# Patient Record
Sex: MALE | Race: BLACK OR AFRICAN AMERICAN | NOT HISPANIC OR LATINO | Employment: FULL TIME | URBAN - METROPOLITAN AREA
[De-identification: names, ages, dates, MRNs, and addresses within clinical notes are randomized per-mention and may not be internally consistent; named-entity substitution may affect disease eponyms.]

---
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
## 2020-01-19 ENCOUNTER — HOSPITAL ENCOUNTER (EMERGENCY)
Facility: CLINIC | Age: 20
Discharge: HOME | End: 2020-01-19
Attending: EMERGENCY MEDICINE | Admitting: EMERGENCY MEDICINE
Payer: COMMERCIAL

## 2020-01-19 VITALS
RESPIRATION RATE: 18 BRPM | SYSTOLIC BLOOD PRESSURE: 115 MMHG | HEART RATE: 78 BPM | HEIGHT: 74 IN | DIASTOLIC BLOOD PRESSURE: 77 MMHG | OXYGEN SATURATION: 99 % | BODY MASS INDEX: 26.31 KG/M2 | TEMPERATURE: 98.2 F | WEIGHT: 205 LBS
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
## 2020-01-19 PROCEDURE — 99283 EMERGENCY DEPT VISIT LOW MDM: CPT

## 2020-01-19 ASSESSMENT — ENCOUNTER SYMPTOMS: WOUND: 1

## 2020-01-19 ASSESSMENT — MIFFLIN-ST. JEOR: SCORE: 2009.62

## 2020-01-19 NOTE — ED AVS SNAPSHOT
Emergency Department  [address] [place] [address]  Phone:  [phone]  Fax:  [phone]                                    Maurilio Mattson    MRN: [id number]    Department:   Emergency Department   Date of Visit:  1/19/2020           After Visit Summary Signature Page    I have received my discharge instructions, and my questions have been answered. I have discussed any challenges I see with this plan with the nurse or doctor.    ..........................................................................................................................................  Patient/Patient Representative Signature      ..........................................................................................................................................  Patient Representative Print Name and Relationship to Patient    ..................................................               ................................................  Date                                   Time    ..........................................................................................................................................  Reviewed by Signature/Title    ...................................................              ..............................................  Date                                               Time          22EPIC Rev 08/18
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
## 2020-01-20 NOTE — ED NOTES
RN at bedside to speak to patient. Updated on plan of care. Supervisor from [facility] on the phone with MD. MD to update patient and family as soon as possible.
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
## 2020-03-28 ENCOUNTER — HOSPITAL ENCOUNTER (EMERGENCY)
Facility: CLINIC | Age: 20
Discharge: HOME | End: 2020-03-28
Attending: EMERGENCY MEDICINE | Admitting: EMERGENCY MEDICINE
Payer: COMMERCIAL

## 2020-03-28 ENCOUNTER — APPOINTMENT (OUTPATIENT)
Dept: CT IMAGING | Facility: CLINIC | Age: 20
End: 2020-03-28
Attending: EMERGENCY MEDICINE
Payer: COMMERCIAL

## 2020-03-28 VITALS — DIASTOLIC BLOOD PRESSURE: 67 MMHG | OXYGEN SATURATION: 99 % | SYSTOLIC BLOOD PRESSURE: 117 MMHG

## 2020-03-28 DIAGNOSIS — S05.02XA ABRASION OF LEFT CORNEA, INITIAL ENCOUNTER: ICD-10-CM

## 2020-03-28 DIAGNOSIS — S00.12XA PERIORBITAL ECCHYMOSIS OF LEFT EYE, INITIAL ENCOUNTER: ICD-10-CM

## 2020-03-28 LAB
AMPHETAMINES UR QL SCN: NEGATIVE
BARBITURATES UR QL: NEGATIVE
BENZODIAZ UR QL: NEGATIVE
CANNABINOIDS UR QL SCN: POSITIVE
COCAINE UR QL: NEGATIVE
OPIATES UR QL SCN: NEGATIVE
PCP UR QL SCN: NEGATIVE

## 2020-03-28 PROCEDURE — 70486 CT MAXILLOFACIAL W/O DYE: CPT

## 2020-03-28 PROCEDURE — 25000125 ZZHC RX 250: Performed by: EMERGENCY MEDICINE

## 2020-03-28 PROCEDURE — 99285 EMERGENCY DEPT VISIT HI MDM: CPT | Mod: 25

## 2020-03-28 PROCEDURE — 70450 CT HEAD/BRAIN W/O DYE: CPT

## 2020-03-28 PROCEDURE — 80307 DRUG TEST PRSMV CHEM ANLYZR: CPT | Performed by: EMERGENCY MEDICINE

## 2020-03-28 RX ORDER — ERYTHROMYCIN 5 MG/G
0.5 OINTMENT OPHTHALMIC 4 TIMES DAILY
Qty: 1 TUBE | Refills: 0 | Status: SHIPPED | OUTPATIENT
Start: 2020-03-28 | End: 2020-04-04

## 2020-03-28 RX ORDER — PROPARACAINE HYDROCHLORIDE 5 MG/ML
2 SOLUTION/ DROPS OPHTHALMIC ONCE
Status: COMPLETED | OUTPATIENT
Start: 2020-03-28 | End: 2020-03-28

## 2020-03-28 RX ADMIN — PROPARACAINE HYDROCHLORIDE 2 DROP: 5 SOLUTION/ DROPS OPHTHALMIC at 05:26

## 2020-03-28 RX ADMIN — FLUORESCEIN SODIUM 1 STRIP: 1 STRIP OPHTHALMIC at 05:26

## 2020-03-28 ASSESSMENT — ENCOUNTER SYMPTOMS
LIGHT-HEADEDNESS: 1
WOUND: 1
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
## 2020-03-28 NOTE — ED AVS SNAPSHOT
Emergency Department  [address] [place] [address]  Phone:  [phone]  Fax:  [phone]                                    Maurilio Mattson    MRN: [id number]    Department:   Emergency Department   Date of Visit:  3/28/2020           After Visit Summary Signature Page    I have received my discharge instructions, and my questions have been answered. I have discussed any challenges I see with this plan with the nurse or doctor.    ..........................................................................................................................................  Patient/Patient Representative Signature      ..........................................................................................................................................  Patient Representative Print Name and Relationship to Patient    ..................................................               ................................................  Date                                   Time    ..........................................................................................................................................  Reviewed by Signature/Title    ...................................................              ..............................................  Date                                               Time          22EPIC Rev 08/18

## 2020-03-28 NOTE — ED TRIAGE NOTES
"Patient presented to triage desk multiple times.  With a few male friends, his Mom.  Patient yelling, he was yelling at security guards initially.  Then when writer asked him to stop yelling.  He aggressively approached the triage desk, came over the glass surround, yelling.  \"Hey, I don't like you all.  I don't like how you all are talking to me!  And you know what!  I have that Coronavirus too!  Yeah!  I am I going to cough that shit all over you!\"  He proceeded to pull his mask down & cough & spit at the staff over the glass surrounding the triage desk.     Patient was beligerent.  Unable to get under control.  Asked patient to sit outside with his Mother & friends.  Will be willing to see him when he is able to give us his name & date of birth.  Everyone in this group is yelling at someone & currently no one is able to give a name or .  They did step out briefly.  Came back into the ED triage area.  Yelling ensued again & escalated.  Patient crying & beligerent.  Does have a left swollen eye.  Yelling again; \"I am going to kill all of you!\"  Patient continually making threats to security & ED staff.  Due to unable to get this entire group under control or any members to assist with patient [place] was called for assistance.    "

## 2020-04-16 ENCOUNTER — HOSPITAL ENCOUNTER (EMERGENCY)
Facility: CLINIC | Age: 20
Discharge: HOME | End: 2020-04-16
Attending: EMERGENCY MEDICINE | Admitting: EMERGENCY MEDICINE
Payer: COMMERCIAL

## 2020-04-16 VITALS — DIASTOLIC BLOOD PRESSURE: 79 MMHG | HEART RATE: 116 BPM | OXYGEN SATURATION: 93 % | SYSTOLIC BLOOD PRESSURE: 96 MMHG

## 2020-04-16 DIAGNOSIS — R45.851 SUICIDAL IDEATION: ICD-10-CM
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
## 2020-04-16 DIAGNOSIS — F10.229 ACUTE ALCOHOLIC INTOXICATION IN ALCOHOLISM WITH COMPLICATION (H): ICD-10-CM

## 2020-04-16 LAB
ALBUMIN SERPL-MCNC: 3.8 G/DL (ref 3.4–5)
ALP SERPL-CCNC: 97 U/L (ref 40–150)
ALT SERPL W P-5'-P-CCNC: 22 U/L (ref 0–70)
AMPHETAMINES UR QL SCN: NEGATIVE
ANION GAP SERPL CALCULATED.3IONS-SCNC: 11 MMOL/L (ref 3–14)
AST SERPL W P-5'-P-CCNC: 21 U/L (ref 0–45)
BARBITURATES UR QL: NEGATIVE
BASOPHILS # BLD AUTO: 0 10E9/L (ref 0–0.2)
BASOPHILS NFR BLD AUTO: 0.4 %
BENZODIAZ UR QL: POSITIVE
BILIRUB SERPL-MCNC: 0.3 MG/DL (ref 0.2–1.3)
BUN SERPL-MCNC: 14 MG/DL (ref 7–30)
CALCIUM SERPL-MCNC: 8.7 MG/DL (ref 8.5–10.1)
CANNABINOIDS UR QL SCN: POSITIVE
CHLORIDE SERPL-SCNC: 109 MMOL/L (ref 94–109)
CO2 SERPL-SCNC: 23 MMOL/L (ref 20–32)
COCAINE UR QL: NEGATIVE
CREAT SERPL-MCNC: 0.95 MG/DL (ref 0.66–1.25)
DIFFERENTIAL METHOD BLD: ABNORMAL
EOSINOPHIL # BLD AUTO: 0 10E9/L (ref 0–0.7)
EOSINOPHIL NFR BLD AUTO: 0 %
ERYTHROCYTE [DISTWIDTH] IN BLOOD BY AUTOMATED COUNT: 12.4 % (ref 10–15)
ETHANOL SERPL-MCNC: 0.19 G/DL
GFR SERPL CREATININE-BSD FRML MDRD: >90 ML/MIN/{1.73_M2}
GLUCOSE SERPL-MCNC: 94 MG/DL (ref 70–99)
HCT VFR BLD AUTO: 43.4 % (ref 40–53)
HGB BLD-MCNC: 15 G/DL (ref 13.3–17.7)
IMM GRANULOCYTES # BLD: 0 10E9/L (ref 0–0.4)
IMM GRANULOCYTES NFR BLD: 0.4 %
LYMPHOCYTES # BLD AUTO: 1.7 10E9/L (ref 0.8–5.3)
LYMPHOCYTES NFR BLD AUTO: 61.5 %
MCH RBC QN AUTO: 28.9 PG (ref 26.5–33)
MCHC RBC AUTO-ENTMCNC: 34.6 G/DL (ref 31.5–36.5)
MCV RBC AUTO: 84 FL (ref 78–100)
MONOCYTES # BLD AUTO: 0 10E9/L (ref 0–1.3)
MONOCYTES NFR BLD AUTO: 0 %
NEUTROPHILS # BLD AUTO: 1.1 10E9/L (ref 1.6–8.3)
NEUTROPHILS NFR BLD AUTO: 37.7 %
NRBC # BLD AUTO: 0 10*3/UL
NRBC BLD AUTO-RTO: 0 /100
OPIATES UR QL SCN: NEGATIVE
PCP UR QL SCN: NEGATIVE
PLATELET # BLD AUTO: 203 10E9/L (ref 150–450)
POTASSIUM SERPL-SCNC: 3.1 MMOL/L (ref 3.4–5.3)
PROT SERPL-MCNC: 7.1 G/DL (ref 6.8–8.8)
RBC # BLD AUTO: 5.19 10E12/L (ref 4.4–5.9)
SODIUM SERPL-SCNC: 143 MMOL/L (ref 133–144)
WBC # BLD AUTO: 2.8 10E9/L (ref 4–11)

## 2020-04-16 PROCEDURE — 99285 EMERGENCY DEPT VISIT HI MDM: CPT | Mod: 25

## 2020-04-16 PROCEDURE — 80307 DRUG TEST PRSMV CHEM ANLYZR: CPT | Performed by: EMERGENCY MEDICINE

## 2020-04-16 PROCEDURE — 25000132 ZZH RX MED GY IP 250 OP 250 PS 637: Performed by: EMERGENCY MEDICINE

## 2020-04-16 PROCEDURE — 85025 COMPLETE CBC W/AUTO DIFF WBC: CPT | Performed by: EMERGENCY MEDICINE

## 2020-04-16 PROCEDURE — 90791 PSYCH DIAGNOSTIC EVALUATION: CPT

## 2020-04-16 PROCEDURE — 80320 DRUG SCREEN QUANTALCOHOLS: CPT | Performed by: EMERGENCY MEDICINE

## 2020-04-16 PROCEDURE — 80053 COMPREHEN METABOLIC PANEL: CPT | Performed by: EMERGENCY MEDICINE

## 2020-04-16 RX ORDER — HYDROXYZINE HYDROCHLORIDE 25 MG/1
25-50 TABLET, FILM COATED ORAL EVERY 8 HOURS PRN
Status: DISCONTINUED | OUTPATIENT
Start: 2020-04-16 | End: 2020-04-16 | Stop reason: HOSPADM

## 2020-04-16 RX ORDER — ESCITALOPRAM OXALATE 10 MG/1
10 TABLET ORAL ONCE
Status: COMPLETED | OUTPATIENT
Start: 2020-04-16 | End: 2020-04-16

## 2020-04-16 RX ADMIN — HYDROXYZINE HYDROCHLORIDE 25 MG: 25 TABLET, FILM COATED ORAL at 07:45

## 2020-04-16 RX ADMIN — ESCITALOPRAM OXALATE 10 MG: 10 TABLET ORAL at 07:45

## 2020-04-16 NOTE — ED PROVIDER NOTES
"  History     Chief Complaint:  Suicidal and Alcohol Intoxication      HPI (limited due to patient sedation on arrival to ED)  Maurilio Mattson Jr. is a 19 year old male with a history of substance abuse and ADHD who presents via EMS with alcohol intoxication and suicidal thoughts.  Per EMS report police were called to the scene where patient was witnessed holding a knife to his neck, threatening to kill himself in front of his family and friends after having a break-up with his girlfriend earlier this evening.  Patient apparently had been drinking earlier this evening as well.  Patient had told officers that he wanted to stab himself in the neck and he stated \"I am suicidal.\"  Patient was quite aggressive with police and did require IV sedation medication including 2 mg of Versed and 5 mg of Haldol prehospital.  Patient is sedated upon arrival and not restrained.  Unfortunately patient is unable to contribute to history at this time due to significant sedation medication and intoxication.    Allergies:  No Known Drug Allergies     Medications:    Adderall  Focalin  Lexapro  Atarax  Melatonin   Albuterol     Past Medical History:    ADHD    Past Surgical History:    Hernia repair    Family History:    Family history reviewed. No pertinent family history.     Social History:  Smoking Status: Never Smoker  Alcohol Use: Negative   Drug Use: Negative  PCP: Jojo Tillman   Marital Status:  Single      Review of Systems   Unable to perform ROS: Mental status change     Physical Exam   No data found.       Physical Exam  General: Sedated, appears well-developed and well-nourished.    In no distress  HEENT:  Head:  Atraumatic  Ears:  External ears are normal  Mouth/Throat:  Oropharynx is without erythema or exudate and mucous membranes are moist.   Eyes:   Conjunctivae normal and EOM are normal. No scleral icterus.  CV:  Normal rate, regular rhythm, normal heart sounds and radial pulses are 2+ and symmetric.  No " murmur.  Resp:  Breath sounds are clear bilaterally    Non-labored, no retractions or accessory muscle use  GI:  Abdomen is soft, no distension, no tenderness.   MS:  Normal range of motion. No edema.    Back atraumatic.  Skin:  Warm and dry.  No rash or lesions noted.  Neuro: Sedated.  Moving all extremities.    Psych:  Unable to assess at this time due to intoxication and sedation medications.  Reported suicidal statements and actions at home earlier this evening.      Emergency Department Course     Laboratory:  Laboratory findings were communicated with the patient who voiced understanding of the findings.    Drug abuse screen 77 urine: pending     CBC: WBC 2.8 (L), HGB 15.0,   CMP: potassium 3.1 (L) o/w WNL (Creatinine 0.95)  Alcohol ethyl: 0.19 (H)       Emergency Department Course:    0325 Nursing notes and vitals reviewed. I performed an exam of the patient as documented above.     0339 IV was inserted and blood was drawn for laboratory testing, results above.      Patient is signed out to my associate Dr. Crowell.    Impression & Plan      Medical Decision Making:  Maurilio Mattson  is a 19 year old male with past medical history of ADHD and substance abuse who presents with intoxication, agitation, and suicidality.  Patient unfortunately is sedated on arrival and unable to contribute to exam or history taking.  There were concerning actions reported at home with patient threatening to stab himself in the neck.  Patient apparently was also holding a knife at the scene.  These gestures are highly concerning for suicidal ideation and I do feel the patient warrants mental health evaluation upon sobering.  Patient continues to sober appropriately while under my care in the emergency department.  His blood alcohol concentration is 0.19.  Patient care was signed out to my oncoming partner  awaiting clinical sobriety and mental health evaluation.    Diagnosis:    ICD-10-CM    1. Acute  alcoholic intoxication in alcoholism with complication (H)  F10.229 CBC with platelets differential     Comprehensive metabolic panel     Alcohol ethyl   2. Suicidal ideation  R45.851    3. Suicide attempt by inadequate means, initial encounter (H)  X83.8XXA      Disposition:   Patient is signed out to my associate Dr. Crowell.    Scribe Disclosure:  I, Mich Carranza, am serving as a scribe at 3:27 AM on 4/16/2020 to document services personally performed by Alejandro Velazquez MD based on my observations and the provider's statements to me.       EMERGENCY DEPARTMENT       Alejandro Velazquez MD  04/16/20 0569

## 2020-04-16 NOTE — ED NOTES
"Received report and went in and introduced self to patient. Patient tearful on and off stating \"I just want to go home, im going crazier being in here\". \"Im not suicidal anymore and I know my mom and girlfriend are at my moms right now and they want me to come home\". I explained to patient that he has to DEC first and then we will have a plan together. Patient keeps saying \"come on bro, I need to get out of here, my heart is racing and im freaking out\". I told patient I will bring him his morning meds and anxiety meds.   "

## 2020-04-16 NOTE — ED TRIAGE NOTES
"Patient arrived via EMS, police were called when the patient was witnessed holding a knife to his neck, threatening to kill himself, in front of family and friends after breaking up with his girlfriend earlier tonight. Patient told officers that he wanted stab himself in the neck and stated \"I am suicidal\", he was aggressive with police and EMS on arrival but he has been cooperative with staff for the most part. Patient is intoxicated.  "

## 2020-04-16 NOTE — ED NOTES
Bed: BH3  Expected date:   Expected time:   Means of arrival:   Comments:  449 20m psych eval eta 1 min

## 2020-04-16 NOTE — ED AVS SNAPSHOT
Emergency Department  [address] [place] [id number]  Phone:  [phone]  Fax:  [phone]                                    Maurilio Mattson    MRN: [id number]    Department:   Emergency Department   Date of Visit:  4/16/2020           After Visit Summary Signature Page    I have received my discharge instructions, and my questions have been answered. I have discussed any challenges I see with this plan with the nurse or doctor.    ..........................................................................................................................................  Patient/Patient Representative Signature      ..........................................................................................................................................  Patient Representative Print Name and Relationship to Patient    ..................................................               ................................................  Date                                   Time    ..........................................................................................................................................  Reviewed by Signature/Title    ...................................................              ..............................................  Date                                               Time          22EPIC Rev 08/18

## 2020-04-16 NOTE — ED PROVIDER NOTES
Patient has sobered up appropriately and has now been seen by Neisha, our DEC .  She also spoke with the patient's mother.  The patient currently is not suicidal and indicates that he was doing this just because he was drunk and upset from the break-up with his girlfriend.  He therefore is deemed safe for discharge.  Neisha is also setting up outpatient therapy for the patient, and I also recommended he follow-up with his physician as well.  He should return with any concerns or worsening symptoms.  The patient's mother is going to come pick him up.     Kunal Crowell MD  04/16/20 4055

## 2020-08-13 ENCOUNTER — HOSPITAL PATHOLOGY (OUTPATIENT)
Dept: OTHER | Facility: CLINIC | Age: 20
End: 2020-08-13

## 2020-08-17 LAB — COPATH REPORT: NORMAL

## 2020-08-26 ENCOUNTER — HOSPITAL ENCOUNTER (EMERGENCY)
Facility: CLINIC | Age: 20
Discharge: HOME | End: 2020-08-26
Attending: EMERGENCY MEDICINE | Admitting: EMERGENCY MEDICINE
Payer: COMMERCIAL

## 2020-08-26 VITALS
DIASTOLIC BLOOD PRESSURE: 85 MMHG | RESPIRATION RATE: 18 BRPM | TEMPERATURE: 97.4 F | HEART RATE: 60 BPM | OXYGEN SATURATION: 99 % | SYSTOLIC BLOOD PRESSURE: 135 MMHG

## 2020-08-26 DIAGNOSIS — F41.9 ANXIETY: ICD-10-CM

## 2020-08-26 PROCEDURE — 99283 EMERGENCY DEPT VISIT LOW MDM: CPT

## 2020-08-26 RX ORDER — LORAZEPAM 1 MG/1
1 TABLET ORAL ONCE
Status: DISCONTINUED | OUTPATIENT
Start: 2020-08-26 | End: 2020-08-26 | Stop reason: HOSPADM

## 2020-08-26 ASSESSMENT — ENCOUNTER SYMPTOMS: HALLUCINATIONS: 0

## 2020-08-26 NOTE — ED TRIAGE NOTES
Patient currently under self quarantine since last Wednesday secondary to positive covid exposure.  Patient reports anxiety and needing medication.  Patient denies SI.  Patient arrives voluntarily.

## 2020-08-26 NOTE — ED PROVIDER NOTES
History     Chief Complaint:  Panic Attack      HPI   Maurilio Mattson Jr. is a 20 year old male current marijuana user who presents with panic attack. The patient comes in stating the his girlfriend is going to be a freshman in [place] and wants to take a break and he hates his life. This is the same girlfriend who broke up with him on 04/16/2020 and the patient was subsequently seen in the emergency department for suicidal ideations. He reports that he doesn't get along with brother and sister or half the time with his mother. He is feeling depressed and does not like himself. He is not on any medications. He smokes mariajuana daily and occasionally drinks alcohol. No auditory or visual hallucinations. He has seen a psychiatrist in the past, but nothing active now. Notes that hydroxyzine did not help.     Allergies:  No known drug allergies    Medications:    The patient is not currently taking any prescribed medications.    Past Medical History:    ADHD  Suicidal ideations     Past Surgical History:    Hernia repair    Family History:    History reviewed. No pertinent family history.     Social History:  Smoking status: no  Alcohol use: Yes  Drug use: Yes - marijuana   The patient presents to the emergency department by personal vehicle     PCP: Jojo Tillman  Marital Status:  Single [1]    Review of Systems   Psychiatric/Behavioral: Negative for hallucinations.        Depressed   All other systems reviewed and are negative.      Physical Exam     Patient Vitals for the past 24 hrs:   BP Temp Temp src Pulse Resp SpO2   08/26/20 0819 135/85 97.4  F (36.3  C) Oral 60 18 99 %       Physical Exam  GENERAL: well developed, pleasant  HEAD: atraumatic  EYES: pupils reactive, extraocular muscles intact, conjunctivae normal  ENT:  mucus membranes moist  NECK:  trachea midline, normal range of motion  RESPIRATORY: no tachypnea, breath sounds clear to auscultation   CVS: normal S1/S2, no murmurs, intact distal  pulses  ABDOMEN: soft, nontender, nondistention  MUSCULOSKELETAL: no deformities  SKIN: warm and dry, no acute rashes or ulceration  NEURO: GCS 15, cranial nerves intact, alert and oriented x3  PSYCH: Good eye contact starts to ramp up slightly when talking about his girlfriend but is able to be redirected not responding to external stimuli, not suicidal    Emergency Department Course   Laboratory:  Laboratory findings were communicated with the patient who voiced understanding of the findings.    Drug abuse screen 77 urine ([place], , ): did not complete before patient eloped     Emergency Department Course:  Past medical records, nursing notes, and vitals reviewed.  0820: I performed an exam of the patient and obtained history, as documented above.     0834: I spoke with the DEC      0837: I rechecked the patient     0853: patient eloped   Impression & Plan   Medical Decision Making:    Patient presents with anxiety and depression with recent break-up with his girlfriend.  Patient is not suicidal.  He is not currently taking any medications.  Did walk back to ask DEC to talk with him.  I was then informed that the patient was leaving.  I went back and the patient had already left.  He noted to the nursing team that he wanted to talk to the doctor and not be asked a bunch more questions.  Patient is not suicidal or psychotic he does not need to be tracked down.  Patient eloped before the encounter could be finished.    Diagnosis:    ICD-10-CM    1. Anxiety  F41.9        Disposition:  Patient eloped     [facility]  8/26/2020    EMERGENCY DEPARTMENT  Scribe Disclosure:  I, [facility], am serving as a scribe at 8:20 AM on 8/26/2020 to document services personally performed by Corey Payton MD based on my observations and the provider's statements to me.        Corey Payton MD  08/26/20 8051

## 2022-07-27 ENCOUNTER — HOSPITAL ENCOUNTER (EMERGENCY)
Facility: CLINIC | Age: 22
Discharge: HOME | End: 2022-07-28
Attending: EMERGENCY MEDICINE | Admitting: EMERGENCY MEDICINE
Payer: COMMERCIAL

## 2022-07-27 VITALS
WEIGHT: 182 LBS | HEART RATE: 60 BPM | OXYGEN SATURATION: 98 % | DIASTOLIC BLOOD PRESSURE: 76 MMHG | HEIGHT: 74 IN | BODY MASS INDEX: 23.36 KG/M2 | TEMPERATURE: 97.8 F | RESPIRATION RATE: 21 BRPM | SYSTOLIC BLOOD PRESSURE: 128 MMHG

## 2022-07-27 DIAGNOSIS — M62.82 NON-TRAUMATIC RHABDOMYOLYSIS: ICD-10-CM

## 2022-07-27 LAB
ALBUMIN SERPL-MCNC: 4 G/DL (ref 3.4–5)
ALP SERPL-CCNC: 119 U/L (ref 40–150)
ALT SERPL W P-5'-P-CCNC: 76 U/L (ref 0–70)
ANION GAP SERPL CALCULATED.3IONS-SCNC: 6 MMOL/L (ref 3–14)
AST SERPL W P-5'-P-CCNC: 68 U/L (ref 0–45)
BASOPHILS # BLD AUTO: 0 10E3/UL (ref 0–0.2)
BASOPHILS NFR BLD AUTO: 0 %
BILIRUB SERPL-MCNC: 0.6 MG/DL (ref 0.2–1.3)
BUN SERPL-MCNC: 31 MG/DL (ref 7–30)
CALCIUM SERPL-MCNC: 8.9 MG/DL (ref 8.5–10.1)
CHLORIDE BLD-SCNC: 106 MMOL/L (ref 94–109)
CK SERPL-CCNC: 1622 U/L (ref 30–300)
CO2 SERPL-SCNC: 28 MMOL/L (ref 20–32)
CREAT SERPL-MCNC: 1.2 MG/DL (ref 0.66–1.25)
EOSINOPHIL # BLD AUTO: 0 10E3/UL (ref 0–0.7)
EOSINOPHIL NFR BLD AUTO: 0 %
ERYTHROCYTE [DISTWIDTH] IN BLOOD BY AUTOMATED COUNT: 12.6 % (ref 10–15)
GFR SERPL CREATININE-BSD FRML MDRD: 87 ML/MIN/1.73M2
GLUCOSE BLD-MCNC: 115 MG/DL (ref 70–99)
HCT VFR BLD AUTO: 39.2 % (ref 40–53)
HGB BLD-MCNC: 13.5 G/DL (ref 13.3–17.7)
IMM GRANULOCYTES # BLD: 0 10E3/UL
IMM GRANULOCYTES NFR BLD: 0 %
LYMPHOCYTES # BLD AUTO: 2.5 10E3/UL (ref 0.8–5.3)
LYMPHOCYTES NFR BLD AUTO: 39 %
MCH RBC QN AUTO: 28.8 PG (ref 26.5–33)
MCHC RBC AUTO-ENTMCNC: 34.4 G/DL (ref 31.5–36.5)
MCV RBC AUTO: 84 FL (ref 78–100)
MONOCYTES # BLD AUTO: 0.4 10E3/UL (ref 0–1.3)
MONOCYTES NFR BLD AUTO: 6 %
NEUTROPHILS # BLD AUTO: 3.4 10E3/UL (ref 1.6–8.3)
NEUTROPHILS NFR BLD AUTO: 55 %
NRBC # BLD AUTO: 0 10E3/UL
NRBC BLD AUTO-RTO: 0 /100
PLATELET # BLD AUTO: 189 10E3/UL (ref 150–450)
POTASSIUM BLD-SCNC: 4 MMOL/L (ref 3.4–5.3)
PROT SERPL-MCNC: 6.7 G/DL (ref 6.8–8.8)
RBC # BLD AUTO: 4.69 10E6/UL (ref 4.4–5.9)
SODIUM SERPL-SCNC: 140 MMOL/L (ref 133–144)
WBC # BLD AUTO: 6.4 10E3/UL (ref 4–11)

## 2022-07-27 PROCEDURE — 80053 COMPREHEN METABOLIC PANEL: CPT | Performed by: EMERGENCY MEDICINE

## 2022-07-27 PROCEDURE — 258N000003 HC RX IP 258 OP 636: Performed by: EMERGENCY MEDICINE

## 2022-07-27 PROCEDURE — 82550 ASSAY OF CK (CPK): CPT | Performed by: PHYSICIAN ASSISTANT

## 2022-07-27 PROCEDURE — 99283 EMERGENCY DEPT VISIT LOW MDM: CPT | Mod: 25

## 2022-07-27 PROCEDURE — 36415 COLL VENOUS BLD VENIPUNCTURE: CPT | Performed by: PHYSICIAN ASSISTANT

## 2022-07-27 PROCEDURE — 96360 HYDRATION IV INFUSION INIT: CPT

## 2022-07-27 PROCEDURE — 85025 COMPLETE CBC W/AUTO DIFF WBC: CPT | Performed by: EMERGENCY MEDICINE

## 2022-07-27 PROCEDURE — 99283 EMERGENCY DEPT VISIT LOW MDM: CPT

## 2022-07-27 RX ADMIN — SODIUM CHLORIDE 2000 ML: 9 INJECTION, SOLUTION INTRAVENOUS at 23:14

## 2022-07-27 ASSESSMENT — ENCOUNTER SYMPTOMS
NAUSEA: 1
MYALGIAS: 1
VOMITING: 1

## 2022-07-28 ASSESSMENT — ENCOUNTER SYMPTOMS
SHORTNESS OF BREATH: 0
FEVER: 0

## 2022-07-28 NOTE — ED PROVIDER NOTES
"  History   Chief Complaint:  Muscle Pain    The history is provided by the patient.      Maurilio Mattson Jr. is a 22 year old male with history of ADHD who presents with muscle pain, nausea, and vomiting. The patient reports he had blood work done 8 days ago at [facility] which indicated rhabdomyolysis. 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 Of note, he was one a vacation two weeks ago and was nauseous and vomiting heavily every night for the whole week of his trip; this is since resolved. He notes that he had not been working out much during that trip. When he came home from the vacation, he reports his muscles cramping so bad that he could not walk. Adds that this lasted for a couple of days and is what prompted him to get his blood work done. Of note, he takes a lot of supplements and vitamins. Denies dark urine.  Notes that muscle pain is resolved.    Review of Systems   Constitutional: Negative for fever.   Respiratory: Negative for shortness of breath.    Cardiovascular: Negative for chest pain.   Gastrointestinal: Positive for nausea (resolved) and vomiting (resolved).   Musculoskeletal: Positive for myalgias (cramp).   All other systems reviewed and are negative.    Allergies:  No Known Allergies    Medications:  Albuterol  Escitalopram  Hydroxyzine  Adderall  Dexmethylphenidate     Past Medical History:     ADHD    Past Surgical History:    Hernia repair    Family History:  Father- heart condition    Social History:  Presents alone  Presents via private vehicle     Physical Exam     Patient Vitals for the past 24 hrs:   BP Temp Pulse Resp SpO2 Height Weight   07/27/22 2330 128/76 -- 60 21 -- -- --   07/27/22 2316 -- -- 66 12 -- -- --   07/27/22 2124 -- -- -- -- -- 1.88 m (6' 2\") 82.6 kg (182 lb)   07/27/22 2122 -- -- -- -- 98 % -- --   07/27/22 2121 129/76 97.8  F (36.6  C) 73 20 -- -- --     Physical " Exam  General: Alert and cooperative with exam. Patient in mild distress. Normal mentation.  Head:  Scalp is NC/AT  Eyes:  No scleral icterus, PERRL  ENT:  The external nose and ears are normal.   Neck:  Normal range of motion without rigidity.  CV:  Regular rate and rhythm    No pathologic murmur   Resp:  Breath sounds are clear bilaterally    Non-labored, no retractions or accessory muscle use  GI:  Abdomen is soft, no distension, no tenderness. No peritoneal signs  MS:  No lower extremity edema   Skin:  Warm and dry, No rash or lesions noted.  Neuro: Oriented x 3. No gross motor deficits.    Emergency Department Course   Laboratory:  Labs Ordered and Resulted from Time of ED Arrival to Time of ED Departure   COMPREHENSIVE METABOLIC PANEL - Abnormal       Result Value    Sodium 140      Potassium 4.0      Chloride 106      Carbon Dioxide (CO2) 28      Anion Gap 6      Urea Nitrogen 31 (*)     Creatinine 1.20      Calcium 8.9      Glucose 115 (*)     Alkaline Phosphatase 119      AST 68 (*)     ALT 76 (*)     Protein Total 6.7 (*)     Albumin 4.0      Bilirubin Total 0.6      GFR Estimate 87     CK TOTAL - Abnormal    CK 1,622 (*)    CBC WITH PLATELETS AND DIFFERENTIAL - Abnormal    WBC Count 6.4      RBC Count 4.69      Hemoglobin 13.5      Hematocrit 39.2 (*)     MCV 84      MCH 28.8      MCHC 34.4      RDW 12.6      Platelet Count 189      % Neutrophils 55      % Lymphocytes 39      % Monocytes 6      % Eosinophils 0      % Basophils 0      % Immature Granulocytes 0      NRBCs per 100 WBC 0      Absolute Neutrophils 3.4      Absolute Lymphocytes 2.5      Absolute Monocytes 0.4      Absolute Eosinophils 0.0      Absolute Basophils 0.0      Absolute Immature Granulocytes 0.0      Absolute NRBCs 0.0       Emergency Department Course:  Reviewed:  I reviewed nursing notes, vitals, past medical history and Care Everywhere    Assessments:  2314 I obtained history and examined the patient as noted above.   2357 I  rechecked and updated the patient.    Interventions:  2314 0.9% NS Bolus, 1000 mL, IV    Disposition:  The patient was discharged to home.     Impression & Plan   Medical Decision Making:  Patient is a 22-year-old male who presents with labs obtained last week reportedly demonstrating evidence of rhabdomyolysis; presents with now resolved nausea, vomiting, and muscle cramping; patient is a .  Medical history and records reviewed.  On evaluation patient is well-appearing with normal vital signs.  He notes no dark-colored urine or current pain/symptoms.  Labs were obtained and demonstrate mildly elevated LFTs and elevated CK consistent with mild rhabdomyolysis.  He did receive IV fluids in the ED.  Patient is tolerating oral intake.  Patient requested discharge after he completed most of 1 L of IV fluid and does not wish to wait for repeat CK testing or additional IV fluids; I think this is reasonable.  I did recommend that he follow-up closely with her PCP for recheck of CK/LFTs to ensure resolution.  Additional supportive care discussed including maintaining adequate hydration.  Return precautions discussed.  Patient discharged home.    Diagnosis:    ICD-10-CM    1. Non-traumatic rhabdomyolysis  M62.82      Scribe Disclosure:  I, Aziza Loza, am serving as a scribe at 11:09 PM on 7/27/2022 to document services personally performed by Chris Martínez DO based on my observations and the provider's statements to me.     Chris Martínez DO  07/28/22 0249

## 2022-07-28 NOTE — ED NOTES
Bed: ED25  Expected date:   Expected time:   Means of arrival:   Comments:  Hold for Twila - evelyn

## 2022-07-28 NOTE — ED TRIAGE NOTES
"Pt states he is a  who has been having muscle cramps, nausea and vomiting. Pt was seen by his family provider who zay some labs last week and told the patient to go to ED as he has \"Rhapdomylosis\" Pt is strongly believes that he has this condition and will go into liver and kidney failure.       "

## 2022-07-28 NOTE — ED NOTES
Does not want discharge paperwork and would like to leave.  MD aware and writing discharge paperwork

## 2022-08-03 ENCOUNTER — HOSPITAL ENCOUNTER (EMERGENCY)
Facility: CLINIC | Age: 22
Discharge: HOME | End: 2022-08-03
Attending: EMERGENCY MEDICINE | Admitting: EMERGENCY MEDICINE
Payer: COMMERCIAL

## 2022-08-03 VITALS
DIASTOLIC BLOOD PRESSURE: 59 MMHG | SYSTOLIC BLOOD PRESSURE: 129 MMHG | TEMPERATURE: 98.1 F | HEART RATE: 75 BPM | OXYGEN SATURATION: 98 % | WEIGHT: 189 LBS | HEIGHT: 74 IN | BODY MASS INDEX: 24.26 KG/M2 | RESPIRATION RATE: 20 BRPM

## 2022-08-03 DIAGNOSIS — L73.9 FOLLICULITIS: ICD-10-CM

## 2022-08-03 DIAGNOSIS — S60.459A SPLINTER OF FINGER: ICD-10-CM

## 2022-08-03 PROCEDURE — 10120 INC&RMVL FB SUBQ TISS SMPL: CPT

## 2022-08-03 PROCEDURE — 99283 EMERGENCY DEPT VISIT LOW MDM: CPT | Mod: 25

## 2022-08-03 RX ORDER — CEPHALEXIN 500 MG/1
500 CAPSULE ORAL 4 TIMES DAILY
Qty: 28 CAPSULE | Refills: 0 | Status: SHIPPED | OUTPATIENT
Start: 2022-08-03 | End: 2022-08-10

## 2022-08-03 ASSESSMENT — ENCOUNTER SYMPTOMS: ARTHRALGIAS: 1

## 2022-08-03 NOTE — ED TRIAGE NOTES
Ring finger on left hand has splinter underneath finger nail.      Triage Assessment     Row Name 08/03/22 4380       Triage Assessment (Adult)    Airway WDL WDL       Respiratory WDL    Respiratory WDL WDL       Skin Circulation/Temperature WDL    Skin Circulation/Temperature WDL X       Cardiac WDL    Cardiac WDL WDL       Peripheral/Neurovascular WDL    Peripheral Neurovascular WDL WDL       Cognitive/Neuro/Behavioral WDL    Cognitive/Neuro/Behavioral WDL WDL

## 2022-08-03 NOTE — ED PROVIDER NOTES
"  History     Chief Complaint:  Foreign Body in Skin     HPI:  The history is provided by the patient.      Maurilio Mattson Jr. is a 22 year old male with a history of asthma who presents with pain to his left 4th digit after a wooden splinter became lodged under the fingernail while in a sauna last night around 2000. The splinter is still present under the fingernail. 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 Maurilio reports that he is otherwise healthy, although he mentions that he was seen here in the ED last week with mild rhabdomyolysis which he believes is due to his bodybuilding. He is feeling improved after this. He denies medication allergies. Tdap was most recently updated in 2021.    Review of Systems   Musculoskeletal: Positive for arthralgias (splinter under left 4th fingernail).   All other systems reviewed and are negative.    Allergies:  The patient has no known allergies.     Medications:  Adderall  Focalin  Lexapro  Hydroxyzine  Melatonin    Past Medical History:     ADHD  Non-traumatic rhabdomyolysis  Adjustment disorder with mixed anxiety and depressed mood  Anxiety  Asthma     Past Surgical History:    Hernia repair    Social History:  The patient presents to the ED alone.  The patient presents to the ED via car.  The patient works in sales.     Physical Exam     Patient Vitals for the past 24 hrs:   BP Temp Temp src Pulse Resp SpO2 Height Weight   08/03/22 1342 129/59 98.1  F (36.7  C) Temporal 75 20 98 % 1.88 m (6' 2\") 85.7 kg (189 lb)     Physical Exam  SKIN: No erythema of the left ring finger.  There is a small subungual foreign body of the left ring finger.  No purulence associated.  Left medial buttock 1 cm mass which drains purulent matter with soft tissue expression.  HEMATOLOGIC/IMMUNOLOGIC/LYMPHATIC:  No pallor of the left ring finger.  MUSCULOSKELETAL: Full active range of motion of the left ring finger.  Left ring finger is not " swollen.  NEUROLOGIC:  Alert, conversant.  PSYCHIATRIC:  Normal mood.    Emergency Department Course     Procedures    Foreign Body Removal     Procedure: Foreign Body Removal    Consent: Verbal    Risks Discussed: pain, bleeding, damage to adjacent structures, incomplete removal, infection and repeat attempts    Indication: Foreign body     Location: Left ring finger; subungual     Preparation: Alcohol    Anesthesia/Sedation: Digital Block: A digital block was performed with Lidocaine - 1% on the affected digit.    Procedure Detail:   Technique instruments: 18-gauge needle, toothless forceps.  Description: After digital block I elevated the nail from the nailbed surrounding the foreign body utilizing the tip of an 18-gauge needle.  Then using a toothless forcep I extracted the foreign body.  It appeared the entire foreign body was removed..      Patient Status: The patient tolerated the procedure well: Yes. There were no complications.    Emergency Department Course:       Reviewed:  I reviewed nursing notes, vitals, past medical history, Care Everywhere and [facility]    Assessments:  1434 I obtained history and examined the patient as noted above.   1523 I rechecked the patient and explained findings.     Disposition:  The patient was discharged to home.     Impression & Plan     Medical Decision Making:  Maurilio CHANG Mattson Jr. is a 22 year old male who presents to the emergency department for evaluation of a splinter under the nail of the left ring finger.  I was able to extract this foreign body without difficulty during the above procedure.  At the end the visit the patient mentioned that he is concerned about an abscess to the left medial buttock.  I examined his skin.  A 1 cm area that is actively draining.  The patient states that this is improved significantly since he squeezed pus from the area.  I did not think he required incision and drainage as this is already significantly improved and nonfluctuant.  No  surrounding erythema to suggest associated cellulitis.  I did prescribe antibiotic in case the patient develops left ring finger infection from this foreign body.    Diagnosis:    ICD-10-CM    1. Splinter of finger  S60.459A    2. Folliculitis  L73.9      Discharge Medications:  START taking these medications    Details   cephALEXin (KEFLEX) 500 MG capsule Take 1 capsule (500 mg) by mouth 4 times daily for 7 days, Disp-28 capsule, R-0, Local Print     Scribe Disclosure:  I, Sarah Raymundo, am serving as a scribe at 2:29 PM on 8/3/2022 to document services personally performed by Presley Lara MD based on my observations and the provider's statements to me.      Presley Lara MD  08/03/22 9851

## 2024-11-18 ENCOUNTER — IMMUNIZATION (OUTPATIENT)
Dept: FAMILY MEDICINE | Facility: CLINIC | Age: 24
End: 2024-11-18
Payer: COMMERCIAL

## 2024-11-18 VITALS — TEMPERATURE: 97.1 F

## 2024-11-18 DIAGNOSIS — Z23 ENCOUNTER FOR IMMUNIZATION: Primary | ICD-10-CM

## 2024-11-18 PROCEDURE — 90471 IMMUNIZATION ADMIN: CPT

## 2024-11-18 PROCEDURE — 90656 IIV3 VACC NO PRSV 0.5 ML IM: CPT

## 2024-11-18 PROCEDURE — 91320 SARSCV2 VAC 30MCG TRS-SUC IM: CPT

## 2024-11-18 PROCEDURE — 90480 ADMN SARSCOV2 VAC 1/ONLY CMP: CPT

## 2024-11-18 PROCEDURE — 99207 PR NO CHARGE NURSE ONLY: CPT

## 2024-11-18 NOTE — PROGRESS NOTES
Prior to immunization administration, verified patients identity using patient s name and date of birth. Please see Immunization Activity for additional information.     Is the patient's temperature normal (100.5 or less)? Yes     Patient MEETS CRITERIA. PROCEED with vaccine administration.      Patient instructed to remain in clinic for 15 minutes afterwards, and to report any adverse reactions.      Link to Ancillary Visit Immunization Standing Orders SmartSet     Screening performed by Jodi Tsai MA on 11/18/2024 at 3:09 PM.

## 2024-12-10 ENCOUNTER — OFFICE VISIT (OUTPATIENT)
Dept: FAMILY MEDICINE | Facility: CLINIC | Age: 24
End: 2024-12-10
Payer: COMMERCIAL

## 2024-12-10 VITALS
DIASTOLIC BLOOD PRESSURE: 72 MMHG | RESPIRATION RATE: 12 BRPM | HEART RATE: 61 BPM | HEIGHT: 74 IN | OXYGEN SATURATION: 98 % | WEIGHT: 230 LBS | TEMPERATURE: 97.8 F | BODY MASS INDEX: 29.52 KG/M2 | SYSTOLIC BLOOD PRESSURE: 129 MMHG

## 2024-12-10 DIAGNOSIS — Z11.59 NEED FOR HEPATITIS C SCREENING TEST: ICD-10-CM

## 2024-12-10 DIAGNOSIS — Z11.1 SCREENING EXAMINATION FOR PULMONARY TUBERCULOSIS: Primary | ICD-10-CM

## 2024-12-10 DIAGNOSIS — Z23 NEED FOR VACCINATION: ICD-10-CM

## 2024-12-10 DIAGNOSIS — Z11.4 SCREENING FOR HIV (HUMAN IMMUNODEFICIENCY VIRUS): ICD-10-CM

## 2024-12-10 PROCEDURE — 86803 HEPATITIS C AB TEST: CPT | Performed by: FAMILY MEDICINE

## 2024-12-10 PROCEDURE — 90471 IMMUNIZATION ADMIN: CPT | Performed by: FAMILY MEDICINE

## 2024-12-10 PROCEDURE — 90651 9VHPV VACCINE 2/3 DOSE IM: CPT | Performed by: FAMILY MEDICINE

## 2024-12-10 PROCEDURE — 87389 HIV-1 AG W/HIV-1&-2 AB AG IA: CPT | Performed by: FAMILY MEDICINE

## 2024-12-10 PROCEDURE — 86481 TB AG RESPONSE T-CELL SUSP: CPT | Performed by: FAMILY MEDICINE

## 2024-12-10 PROCEDURE — 36415 COLL VENOUS BLD VENIPUNCTURE: CPT | Performed by: FAMILY MEDICINE

## 2024-12-10 PROCEDURE — 99203 OFFICE O/P NEW LOW 30 MIN: CPT | Mod: 25 | Performed by: FAMILY MEDICINE

## 2024-12-10 RX ORDER — ALBUTEROL SULFATE 90 UG/1
2 INHALANT RESPIRATORY (INHALATION) EVERY 4 HOURS PRN
COMMUNITY

## 2024-12-10 RX ORDER — IPRATROPIUM BROMIDE AND ALBUTEROL SULFATE 2.5; .5 MG/3ML; MG/3ML
1 SOLUTION RESPIRATORY (INHALATION) EVERY 6 HOURS PRN
COMMUNITY
Start: 2024-02-14

## 2024-12-10 RX ORDER — FLUTICASONE PROPIONATE AND SALMETEROL 250; 50 UG/1; UG/1
1 POWDER RESPIRATORY (INHALATION) 2 TIMES DAILY
COMMUNITY
Start: 2024-02-14

## 2024-12-10 ASSESSMENT — PAIN SCALES - GENERAL: PAINLEVEL_OUTOF10: NO PAIN (0)

## 2024-12-10 NOTE — PROGRESS NOTES
"  Assessment & Plan     (Z11.1) Screening examination for pulmonary tuberculosis  (primary encounter diagnosis)  Comment: patient reports no previous positive TB test.   Plan: Quantiferon TB Gold Plus            (Z11.4) Screening for HIV (human immunodeficiency virus)  Comment: indications for screening discussed with the patient   Plan: HIV Antigen Antibody Combo            (Z11.59) Need for hepatitis C screening test  Comment: indications for screening discussed with the patient   Plan: Hepatitis C Screen Reflex to HCV RNA Quant and         Genotype            (Z23) Need for vaccination  Comment:   Plan: HPV9 (GARDASIL 9)                HPV vaccination routinely recommended at age 11-12 years (can start at age 9 years), catch-up HPV vaccination recommended for all persons through age 18 years, and for all adults through age 26 years: 2- or 3-dose series depending on age at initial vaccination:               If age 15 years or older at initial vaccination: 3-dose series at 0, 1-2 months, 6 months (minimum intervals: dose 1 to dose 2: 4 weeks / dose 2 to dose 3: 12 weeks / dose 1 to dose 3: 5 months; repeat any dose if administered too soon)      Hayley Thorpe is a 24 year old, presenting for the following health issues:  [facility]      12/10/2024     3:55 PM   Additional Questions   Roomed by catalina   Accompanied by self         12/10/2024     3:55 PM   Patient Reported Additional Medications   Patient reports taking the following new medications no     History of Present Illness       Reason for visit:  Work reasons   He is taking medications regularly.     Pt needs a TB test for his work.    Review of Systems        Objective    /72 (BP Location: Left arm, Patient Position: Sitting, Cuff Size: Adult Large)   Pulse 61   Temp 97.8  F (36.6  C) (Temporal)   Resp 12   Ht 1.88 m (6' 2\")   Wt 104.3 kg (230 lb)   SpO2 98%   BMI 29.53 kg/m    Body mass index is 29.53 kg/m .  Physical Exam "   GENERAL: healthy, alert and no distress  EYES: Eyes grossly normal to inspection, PERRL, EOMI, sclerae white and conjunctivae normal  MS: no gross musculoskeletal defects noted, no edema  SKIN: no suspicious lesions or rashes to visible skin  NEURO: Normal strength and tone, sensory exam grossly normal, mentation intact, oriented times 3 and cranial nerves 2-12 intact  PSYCH: mentation appears normal, affect normal/bright      This document serves as a record of the services and decisions personally performed and made by Dr. Martinez. It was created on his behalf by Jocelin Sarkar, a trained medical scribe. The creation of this document is based the provider's statements to the medical scribe.  Jocelin Sarkar, 4:23 PM         Signed Electronically by: Moo Martinez MD

## 2024-12-10 NOTE — NURSING NOTE
Prior to immunization administration, verified patients identity using patient s name and date of birth. Please see Immunization Activity for additional information.     Screening Questionnaire for Adult Immunization    Are you sick today?   No   Do you have allergies to medications, food, a vaccine component or latex?   No   Have you ever had a serious reaction after receiving a vaccination?   No   Do you have a long-term health problem with heart, lung, kidney, or metabolic disease (e.g., diabetes), asthma, a blood disorder, no spleen, complement component deficiency, a cochlear implant, or a spinal fluid leak?  Are you on long-term aspirin therapy?   No   Do you have cancer, leukemia, HIV/AIDS, or any other immune system problem?   No   Do you have a parent, brother, or sister with an immune system problem?   No   In the past 3 months, have you taken medications that affect  your immune system, such as prednisone, other steroids, or anticancer drugs; drugs for the treatment of rheumatoid arthritis, Crohn s disease, or psoriasis; or have you had radiation treatments?   No   Have you had a seizure, or a brain or other nervous system problem?   No   During the past year, have you received a transfusion of blood or blood    products, or been given immune (gamma) globulin or antiviral drug?   No   For women: Are you pregnant or is there a chance you could become       pregnant during the next month?   No   Have you received any vaccinations in the past 4 weeks?   No     Immunization questionnaire answers were all negative.      Patient instructed to remain in clinic for 15 minutes afterwards, and to report any adverse reactions.     Screening performed by Kaley See MA on 12/10/2024 at 4:34 PM.

## 2024-12-10 NOTE — PATIENT INSTRUCTIONS
At [facility], we strive to deliver an exceptional experience to you, every time we see you. If you receive a survey, please let us know what we are doing well and/or what we could improve upon, as we do value your feedback.  If you have MyChart, you can expect to receive results automatically within 24 hours of their completion.  Your provider will send a note interpreting your results as well.   If you do not have MyChart, you should receive your results in about a week by mail.    Your care team:                            Family Medicine Internal Medicine   MD Wil Kendrick, MD Lena Au, MD Juanpablo Valentine, MD Vilma Pfeiffer, PASamsonC    Kulwant Reaves, MD Pediatrics   Kiara Leon, MD Marion Smith, MD Indiana Cheney, APRN CNP Kay Mata APRN CNP   MD Jessie Rajan, MD Jocelin Smith, CNP     Evan Hua, CNP Same-Day Provider (No follow-up visits)   PRASHANTH Simpson, DNP Genoveva Nino, PRASHANTH Leyva, FNP, BC ACE FernandezC     Clinic hours: Monday - Thursday 7 am-6 pm; Fridays 7 am-5 pm.   Urgent care: Monday - Friday 10 am- 8 pm; Saturday and Sunday 9 am-5 pm.    Clinic: [phone]       [facility]: Monday - Thursday 8 am - 7 pm; Friday 8 am - 6 pm  [facility]: [phone]

## 2024-12-10 NOTE — NURSING NOTE
Prior to immunization administration, verified patients identity using patient s name and date of birth. Please see Immunization Activity for additional information.     Screening Questionnaire for Adult Immunization    Are you sick today?   No   Do you have allergies to medications, food, a vaccine component or latex?   No   Have you ever had a serious reaction after receiving a vaccination?   No   Do you have a long-term health problem with heart, lung, kidney, or metabolic disease (e.g., diabetes), asthma, a blood disorder, no spleen, complement component deficiency, a cochlear implant, or a spinal fluid leak?  Are you on long-term aspirin therapy?   No   Do you have cancer, leukemia, HIV/AIDS, or any other immune system problem?   No   Do you have a parent, brother, or sister with an immune system problem?   No   In the past 3 months, have you taken medications that affect  your immune system, such as prednisone, other steroids, or anticancer drugs; drugs for the treatment of rheumatoid arthritis, Crohn s disease, or psoriasis; or have you had radiation treatments?   No   Have you had a seizure, or a brain or other nervous system problem?   No   During the past year, have you received a transfusion of blood or blood    products, or been given immune (gamma) globulin or antiviral drug?   No   For women: Are you pregnant or is there a chance you could become       pregnant during the next month?   No   Have you received any vaccinations in the past 4 weeks?   No     Immunization questionnaire answers were all negative.      Patient instructed to remain in clinic for 15 minutes afterwards, and to report any adverse reactions.     Screening performed by Kaley See MA on 12/10/2024 at 4:35 PM.

## 2024-12-11 LAB
HCV AB SERPL QL IA: NONREACTIVE
HIV 1+2 AB+HIV1 P24 AG SERPL QL IA: NONREACTIVE

## 2024-12-12 LAB
GAMMA INTERFERON BACKGROUND BLD IA-ACNC: 0.01 IU/ML
M TB IFN-G BLD-IMP: NEGATIVE
M TB IFN-G CD4+ BCKGRND COR BLD-ACNC: 9.99 IU/ML
MITOGEN IGNF BCKGRD COR BLD-ACNC: 0 IU/ML
MITOGEN IGNF BCKGRD COR BLD-ACNC: 0.01 IU/ML
QUANTIFERON MITOGEN: 10 IU/ML
QUANTIFERON NIL TUBE: 0.01 IU/ML
QUANTIFERON TB1 TUBE: 0.01 IU/ML
QUANTIFERON TB2 TUBE: 0.02

## 2024-12-28 ENCOUNTER — HEALTH MAINTENANCE LETTER (OUTPATIENT)
Age: 24
End: 2024-12-28